# Patient Record
Sex: FEMALE | Race: WHITE | NOT HISPANIC OR LATINO | Employment: PART TIME | URBAN - METROPOLITAN AREA
[De-identification: names, ages, dates, MRNs, and addresses within clinical notes are randomized per-mention and may not be internally consistent; named-entity substitution may affect disease eponyms.]

---

## 2021-06-02 ENCOUNTER — RECORDS - HEALTHEAST (OUTPATIENT)
Dept: ADMINISTRATIVE | Facility: CLINIC | Age: 51
End: 2021-06-02

## 2023-11-22 ENCOUNTER — LAB REQUISITION (OUTPATIENT)
Dept: LAB | Facility: CLINIC | Age: 53
End: 2023-11-22

## 2023-11-22 LAB
PATH REPORT.COMMENTS IMP SPEC: NORMAL
PATH REPORT.FINAL DX SPEC: NORMAL
PATH REPORT.GROSS SPEC: NORMAL
PATH REPORT.MICROSCOPIC SPEC OTHER STN: NORMAL
PATH REPORT.RELEVANT HX SPEC: NORMAL
PATH REPORT.RELEVANT HX SPEC: NORMAL
PATH REPORT.SITE OF ORIGIN SPEC: NORMAL

## 2024-09-28 ENCOUNTER — HOSPITAL ENCOUNTER (EMERGENCY)
Facility: CLINIC | Age: 54
Discharge: HOME | End: 2024-09-28
Attending: EMERGENCY MEDICINE | Admitting: EMERGENCY MEDICINE
Payer: COMMERCIAL

## 2024-09-28 ENCOUNTER — APPOINTMENT (OUTPATIENT)
Dept: CT IMAGING | Facility: CLINIC | Age: 54
End: 2024-09-28
Payer: COMMERCIAL

## 2024-09-28 VITALS
OXYGEN SATURATION: 96 % | HEIGHT: 61 IN | SYSTOLIC BLOOD PRESSURE: 138 MMHG | BODY MASS INDEX: 33.99 KG/M2 | WEIGHT: 180 LBS | RESPIRATION RATE: 18 BRPM | DIASTOLIC BLOOD PRESSURE: 74 MMHG | HEART RATE: 89 BPM | TEMPERATURE: 97.8 F

## 2024-09-28 DIAGNOSIS — N20.0 NEPHROLITHIASIS: ICD-10-CM

## 2024-09-28 DIAGNOSIS — N20.1 RIGHT URETERAL STONE: ICD-10-CM

## 2024-09-28 LAB
ALBUMIN SERPL BCG-MCNC: 4 G/DL (ref 3.5–5.2)
ALBUMIN UR-MCNC: 100 MG/DL
ALP SERPL-CCNC: 85 U/L (ref 40–150)
ALT SERPL W P-5'-P-CCNC: 35 U/L (ref 0–50)
ANION GAP SERPL CALCULATED.3IONS-SCNC: 14 MMOL/L (ref 7–15)
APPEARANCE UR: ABNORMAL
AST SERPL W P-5'-P-CCNC: 32 U/L (ref 0–45)
BASOPHILS # BLD AUTO: 0 10E3/UL (ref 0–0.2)
BASOPHILS NFR BLD AUTO: 1 %
BILIRUB DIRECT SERPL-MCNC: NORMAL MG/DL
BILIRUB SERPL-MCNC: 0.3 MG/DL
BILIRUB UR QL STRIP: NEGATIVE
BUN SERPL-MCNC: 13.3 MG/DL (ref 6–20)
CALCIUM SERPL-MCNC: 9.4 MG/DL (ref 8.8–10.4)
CHLORIDE SERPL-SCNC: 105 MMOL/L (ref 98–107)
COLOR UR AUTO: ABNORMAL
CREAT SERPL-MCNC: 0.82 MG/DL (ref 0.51–0.95)
EGFRCR SERPLBLD CKD-EPI 2021: 84 ML/MIN/1.73M2
EOSINOPHIL # BLD AUTO: 0 10E3/UL (ref 0–0.7)
EOSINOPHIL NFR BLD AUTO: 0 %
ERYTHROCYTE [DISTWIDTH] IN BLOOD BY AUTOMATED COUNT: 13 % (ref 10–15)
GLUCOSE SERPL-MCNC: 93 MG/DL (ref 70–99)
GLUCOSE UR STRIP-MCNC: NEGATIVE MG/DL
HCO3 SERPL-SCNC: 19 MMOL/L (ref 22–29)
HCT VFR BLD AUTO: 40.7 % (ref 35–47)
HGB BLD-MCNC: 13.3 G/DL (ref 11.7–15.7)
HGB UR QL STRIP: ABNORMAL
IMM GRANULOCYTES # BLD: 0 10E3/UL
IMM GRANULOCYTES NFR BLD: 0 %
KETONES UR STRIP-MCNC: NEGATIVE MG/DL
LEUKOCYTE ESTERASE UR QL STRIP: ABNORMAL
LYMPHOCYTES # BLD AUTO: 1.9 10E3/UL (ref 0.8–5.3)
LYMPHOCYTES NFR BLD AUTO: 40 %
MAGNESIUM SERPL-MCNC: 1.8 MG/DL (ref 1.7–2.3)
MCH RBC QN AUTO: 28 PG (ref 26.5–33)
MCHC RBC AUTO-ENTMCNC: 32.7 G/DL (ref 31.5–36.5)
MCV RBC AUTO: 86 FL (ref 78–100)
MONOCYTES # BLD AUTO: 0.4 10E3/UL (ref 0–1.3)
MONOCYTES NFR BLD AUTO: 8 %
MUCOUS THREADS #/AREA URNS LPF: PRESENT /LPF
NEUTROPHILS # BLD AUTO: 2.5 10E3/UL (ref 1.6–8.3)
NEUTROPHILS NFR BLD AUTO: 51 %
NITRATE UR QL: NEGATIVE
NRBC # BLD AUTO: 0 10E3/UL
NRBC BLD AUTO-RTO: 0 /100
PH UR STRIP: 5.5 [PH] (ref 5–7)
PLATELET # BLD AUTO: 241 10E3/UL (ref 150–450)
POTASSIUM SERPL-SCNC: 4.2 MMOL/L (ref 3.4–5.3)
PROT SERPL-MCNC: 7 G/DL (ref 6.4–8.3)
RBC # BLD AUTO: 4.75 10E6/UL (ref 3.8–5.2)
RBC URINE: >182 /HPF
SODIUM SERPL-SCNC: 138 MMOL/L (ref 135–145)
SP GR UR STRIP: 1.02 (ref 1–1.03)
SQUAMOUS EPITHELIAL: 1 /HPF
UROBILINOGEN UR STRIP-MCNC: 2 MG/DL
WBC # BLD AUTO: 4.8 10E3/UL (ref 4–11)
WBC URINE: 1 /HPF

## 2024-09-28 PROCEDURE — 258N000003 HC RX IP 258 OP 636

## 2024-09-28 PROCEDURE — 80048 BASIC METABOLIC PNL TOTAL CA: CPT

## 2024-09-28 PROCEDURE — 96361 HYDRATE IV INFUSION ADD-ON: CPT

## 2024-09-28 PROCEDURE — 250N000011 HC RX IP 250 OP 636: Performed by: EMERGENCY MEDICINE

## 2024-09-28 PROCEDURE — 74177 CT ABD & PELVIS W/CONTRAST: CPT

## 2024-09-28 PROCEDURE — 81001 URINALYSIS AUTO W/SCOPE: CPT | Performed by: EMERGENCY MEDICINE

## 2024-09-28 PROCEDURE — 36415 COLL VENOUS BLD VENIPUNCTURE: CPT

## 2024-09-28 PROCEDURE — 83735 ASSAY OF MAGNESIUM: CPT

## 2024-09-28 PROCEDURE — 99285 EMERGENCY DEPT VISIT HI MDM: CPT | Mod: 25

## 2024-09-28 PROCEDURE — 96375 TX/PRO/DX INJ NEW DRUG ADDON: CPT

## 2024-09-28 PROCEDURE — 85025 COMPLETE CBC W/AUTO DIFF WBC: CPT

## 2024-09-28 PROCEDURE — 96374 THER/PROPH/DIAG INJ IV PUSH: CPT | Mod: 59

## 2024-09-28 PROCEDURE — 250N000011 HC RX IP 250 OP 636

## 2024-09-28 RX ORDER — OXYCODONE HYDROCHLORIDE 5 MG/1
5 TABLET ORAL EVERY 6 HOURS PRN
Qty: 12 TABLET | Refills: 0 | Status: SHIPPED | OUTPATIENT
Start: 2024-09-28 | End: 2024-10-01

## 2024-09-28 RX ORDER — ONDANSETRON 4 MG/1
4 TABLET, ORALLY DISINTEGRATING ORAL EVERY 8 HOURS PRN
Qty: 10 TABLET | Refills: 0 | Status: SHIPPED | OUTPATIENT
Start: 2024-09-28 | End: 2024-10-01

## 2024-09-28 RX ORDER — IOPAMIDOL 755 MG/ML
90 INJECTION, SOLUTION INTRAVASCULAR ONCE
Status: COMPLETED | OUTPATIENT
Start: 2024-09-28 | End: 2024-09-28

## 2024-09-28 RX ORDER — KETOROLAC TROMETHAMINE 15 MG/ML
15 INJECTION, SOLUTION INTRAMUSCULAR; INTRAVENOUS ONCE
Status: COMPLETED | OUTPATIENT
Start: 2024-09-28 | End: 2024-09-28

## 2024-09-28 RX ORDER — ONDANSETRON 2 MG/ML
4 INJECTION INTRAMUSCULAR; INTRAVENOUS ONCE
Status: COMPLETED | OUTPATIENT
Start: 2024-09-28 | End: 2024-09-28

## 2024-09-28 RX ADMIN — IOPAMIDOL 90 ML: 755 INJECTION, SOLUTION INTRAVENOUS at 10:56

## 2024-09-28 RX ADMIN — KETOROLAC TROMETHAMINE 15 MG: 15 INJECTION, SOLUTION INTRAMUSCULAR; INTRAVENOUS at 10:09

## 2024-09-28 RX ADMIN — SODIUM CHLORIDE 1000 ML: 9 INJECTION, SOLUTION INTRAVENOUS at 10:03

## 2024-09-28 RX ADMIN — ONDANSETRON 4 MG: 2 INJECTION INTRAMUSCULAR; INTRAVENOUS at 10:07

## 2024-09-28 ASSESSMENT — COLUMBIA-SUICIDE SEVERITY RATING SCALE - C-SSRS
2. HAVE YOU ACTUALLY HAD ANY THOUGHTS OF KILLING YOURSELF IN THE PAST MONTH?: NO
6. HAVE YOU EVER DONE ANYTHING, STARTED TO DO ANYTHING, OR PREPARED TO DO ANYTHING TO END YOUR LIFE?: NO
1. IN THE PAST MONTH, HAVE YOU WISHED YOU WERE DEAD OR WISHED YOU COULD GO TO SLEEP AND NOT WAKE UP?: NO

## 2024-09-28 ASSESSMENT — ACTIVITIES OF DAILY LIVING (ADL)
ADLS_ACUITY_SCORE: 35

## 2024-09-28 NOTE — ED NOTES
Reviewed discharge instructions with pt and daughter. Answered all questions. Provided pt with edda.

## 2024-09-28 NOTE — ED PROVIDER NOTES
"Emergency Department Midlevel Supervisory Note     I had a face to face encounter with this patient seen by the Advanced Practice Provider (TOOTIE). I personally made/approved the management plan and take responsibility for the patient management. I personally saw patient and performed a substantive portion of the visit including all aspects of the medical decision making.     ED Course:  9:37 AM Sera Padilla PA-C staffed patient with me. I agree with their assessment and plan of management, and I will see the patient.  9:39 AM I met with the patient to introduce myself, gather additional history, perform my initial exam, and discuss the plan.   9:50 AM UA with RBCs without bacteria/infection, consistent with possible ureteral stone.  11:14 AM Serum labs reassuring.  CT independently reviewed and shows approx 4mm proximal to mid ureteral stone.    11:58 AM Pt feeling well on re-evaluation.  Updated by TOOTIE Sera regarding results. Referral to [facility] for ureteral stone, Rx for oxycodone/zofran, return precautions understood.      Brief HPI:     Saima Gilbert is a 54 year old female who presents with 2 days of right-sided flank pain with nausea, vomiting, decreased oral inatke, inability to urinate intermittently, and hematuria; denies any dysuria. Reports a prior history fever kidney stones.    I, Slick Rodriguez, am serving as a scribe to document services personally performed by Dr. Carlos A Gasca based on my observations and the provider's statements to me.   I, Carlos A Gasca, DO, attest that Slick Rodriguez was acting in a scribe capacity, has observed my performance of the services and has documented them in accordance with my direction.    Brief Physical Exam: /58   Pulse 86   Temp 97.8  F (36.6  C) (Oral)   Resp 18   Ht 1.549 m (5' 1\")   Wt 81.6 kg (180 lb)   SpO2 100%   BMI 34.01 kg/m    Constitutional:  Alert, in no acute distress  EYES: Conjunctivae clear  HENT:  Atraumatic  Respiratory:  " Respirations even, unlabored, in no acute respiratory distress  Cardiovascular:  Regular rate and rhythm, good peripheral perfusion, 2+ radial and DP pulses b/l  GI: Soft, non-distended, mildly TTP right side abdomen.  No true CVA tenderness, no rash.  No rigidity.  Musculoskeletal:  Moves all 4 extremities equally, grossly symmetrical strength  Integument: Warm & dry. No appreciable rash, erythema.  Neurologic:  Alert & oriented, speech clear and fluent, no focal deficits noted  Psych: Normal mood and affect       MDM:  Pt seen in conjunction with TOOTIE Padilla, here for evaluation of right side/flank pain for 2 days.  Pain sounds fairly constant, but pt reports some hematuria and similar symptoms before with kidney stones.  Agree with labs, CT abd/pelvis, symptomatic treatment and reassessment after that. Consideration for ureteral stone, but also pyelonephritis, diverticulitis/appendicitis, colitis or other intraabdominal pathology.        1. Nephrolithiasis    2. Right ureteral stone        Consults:  none    Labs and Imaging:  Results for orders placed or performed during the hospital encounter of 09/28/24   CT Abdomen Pelvis w Contrast    Impression    IMPRESSION:   Obstructing 0.4 cm calculus in the proximal right ureter with mild upstream collecting system dilation.     UA with Microscopic reflex to Culture    Specimen: Urine, Clean Catch   Result Value Ref Range    Color Urine Light Red (A) Colorless, Straw, Light Yellow, Yellow    Appearance Urine Cloudy (A) Clear    Glucose Urine Negative Negative mg/dL    Bilirubin Urine Negative Negative    Ketones Urine Negative Negative mg/dL    Specific Gravity Urine 1.025 1.001 - 1.030    Blood Urine >1.0 mg/dL (A) Negative    pH Urine 5.5 5.0 - 7.0    Protein Albumin Urine 100 (A) Negative mg/dL    Urobilinogen Urine 2.0 (A) <2.0 mg/dL    Nitrite Urine Negative Negative    Leukocyte Esterase Urine 25 Jessi/uL (A) Negative    Mucus Urine Present (A) None Seen  /LPF    RBC Urine >182 (H) <=2 /HPF    WBC Urine 1 <=5 /HPF    Squamous Epithelials Urine 1 <=1 /HPF   Basic metabolic panel   Result Value Ref Range    Sodium 138 135 - 145 mmol/L    Potassium 4.2 3.4 - 5.3 mmol/L    Chloride 105 98 - 107 mmol/L    Carbon Dioxide (CO2) 19 (L) 22 - 29 mmol/L    Anion Gap 14 7 - 15 mmol/L    Urea Nitrogen 13.3 6.0 - 20.0 mg/dL    Creatinine 0.82 0.51 - 0.95 mg/dL    GFR Estimate 84 >60 mL/min/1.73m2    Calcium 9.4 8.8 - 10.4 mg/dL    Glucose 93 70 - 99 mg/dL   Hepatic function panel   Result Value Ref Range    Protein Total 7.0 6.4 - 8.3 g/dL    Albumin 4.0 3.5 - 5.2 g/dL    Bilirubin Total 0.3 <=1.2 mg/dL    Alkaline Phosphatase 85 40 - 150 U/L    AST 32 0 - 45 U/L    ALT 35 0 - 50 U/L    Bilirubin Direct     Result Value Ref Range    Magnesium 1.8 1.7 - 2.3 mg/dL   CBC with platelets and differential   Result Value Ref Range    WBC Count 4.8 4.0 - 11.0 10e3/uL    RBC Count 4.75 3.80 - 5.20 10e6/uL    Hemoglobin 13.3 11.7 - 15.7 g/dL    Hematocrit 40.7 35.0 - 47.0 %    MCV 86 78 - 100 fL    MCH 28.0 26.5 - 33.0 pg    MCHC 32.7 31.5 - 36.5 g/dL    RDW 13.0 10.0 - 15.0 %    Platelet Count 241 150 - 450 10e3/uL    % Neutrophils 51 %    % Lymphocytes 40 %    % Monocytes 8 %    % Eosinophils 0 %    % Basophils 1 %    % Immature Granulocytes 0 %    NRBCs per 100 WBC 0 <1 /100    Absolute Neutrophils 2.5 1.6 - 8.3 10e3/uL    Absolute Lymphocytes 1.9 0.8 - 5.3 10e3/uL    Absolute Monocytes 0.4 0.0 - 1.3 10e3/uL    Absolute Eosinophils 0.0 0.0 - 0.7 10e3/uL    Absolute Basophils 0.0 0.0 - 0.2 10e3/uL    Absolute Immature Granulocytes 0.0 <=0.4 10e3/uL    Absolute NRBCs 0.0 10e3/uL       I have reviewed the relevant laboratory studies above.    I independently interpreted the following imaging study(s):   CT Abdomen/Pelvis - right ureteral stone    Procedures:  I was present for the key portions of procedures documented in TOOTIE/midlevel note, see midlevel note for further details.    Carlos A  DO DAMI Gasca [facility] EMERGENCY ROOM  [address] [facility] [id number]  [phone]     Carlos A Gasca MD  09/28/24 1200

## 2024-09-28 NOTE — ED TRIAGE NOTES
Pt presents to the ED with c/o right flank pain and abdominal pain for past 2 days. Pt c/o blood in urine, emesis, and chills.

## 2024-09-28 NOTE — DISCHARGE INSTRUCTIONS
Your CT did show a 4 mm obstructing kidney stone on the right side which is likely the cause of your symptoms.  I will send you home with a short course of pain medication as well as a nausea medicine to help with your symptoms until you are able to pass the stone.  Additionally, I did give you a referral to urology for further evaluation and management of kidney stones since you have had multiple bouts of kidney stones.  Please return to the emergency department if you experience fever, chills, worsening nausea vomiting, worsening pain.

## 2024-09-28 NOTE — Clinical Note
Saima Gilbert was seen and treated in our emergency department on 9/28/2024.  She may return to work on 09/29/2024.  Saima was just diagnosed with a kidney stone and is experiencing severe pain as a result. She has been discharge with appropriate medications however as the stone changes position, she could continue to experience pain. Please allow her to stay home from work as needed and provide activity modifications if necessary.     If you have any questions or concerns, please don't hesitate to call.      Sera Padilla PA-C

## 2024-09-28 NOTE — ED PROVIDER NOTES
Emergency Department Encounter   NAME: Saima Gilbert ; AGE: 54 year old female ; YOB: 1970 ; MRN: [id number] ; PCP: No primary care provider on file.   ED PROVIDER: Sera Padilla PA-C    Evaluation Date & Time:   9/28/2024  9:15 AM    CHIEF COMPLAINT:  Flank Pain and Abdominal Pain      FINAL IMPRESSION:  No diagnosis found.      IMPRESSION AND PLAN   MDM: Saima Gilbert is a 54 year old female with a pertinent history of DM 2, HLD, HTN, vertigo who presents to the ED by walk-in for evaluation of right-sided flank pain x 2 days.    Vitals stable. On exam patient is well-appearing and in no acute distress.  Abdomen is soft with mild tenderness to the RLQ and LLQ.  No suprapubic tenderness.  There is notable right-sided CVA tenderness, no left-sided CVA tenderness.  Differentials include UTI, pyelonephritis, nephrolithiasis, appendicitis, ovarian etiology, diverticulitis.  Less concerned for bowel obstruction at this time due to the absence of distention. Patient is s/p cholecystectomy.     Patient given IV fluids, Toradol and Zofran for initial symptom management.  CBC without evidence of leukocytosis, hemoglobin stable at 13.3.  BMP negative for FAITH or emergent electrolyte abnormality, hepatic function unremarkable.  UA with marked amount of blood otherwise no evidence of infectious etiology.  CT did reveal a 0.4 cm obstructing kidney stone in the right proximal ureter which is likely the cause of patient's symptoms. Upon reevaluation, patient continues to rest comfortably in hospital bed and remains vitally stable.  I discussed CT findings with patient and she expressed her understanding.  I explained that given the size of the kidney stone, she should be able to pass it on her own and is not requiring admission at this time, especially given the absence of an associated UTI.  I will prescribe patient a short course of oxycodone and Zofran for outpatient symptom management.  I also did  provide her with an adult urology referral for kidney stone prevention as she has had several bouts of kidney stones in the past.  Patient is amenable to this plan and feels comfortable discharge at this time.    Patient seen in conjunction with Dr. Gasca.    History:  Supplemental history from: Family Member/Significant Other  External Record(s) reviewed: Documented in chart    Work Up:  Chart documentation includes differential considered and any EKGs or imaging independently interpreted by provider, where specified.  In additional to work up documented, I considered the following work up: Documented in chart, if applicable.    External consultation:  Discussion of management with another provider: Documented in chart, if applicable    Complicating factors:  Care impacted by chronic illness: See HPI.  Care affected by social determinants of health: N/A    Disposition considerations:  Admission considered, workup negative for infected kidney stone.    Chart Review: I reviewed urgent care note from 7/31/2023.  Patient presented to urgent care complaining of dysuria and flank pain x 1 week.  She does have a history of kidney stone once in the past.  CT revealed a small nonobstructing renal stone in the right kidney as well as mild pelviectasis of the superior left renal pole.  Patient was given ibuprofen and likely passed kidney stone prior to the visit.  Encouraged to take ibuprofen and stay orally hydrated.  Patient was discharged in stable condition.    ED COURSE:  9:34 AM I met and introduced myself to the patient. I gathered initial history and performed my physical exam. We discussed plan for initial workup.   9:36 AM I have staffed the patient with Dr. Gasca, ED MD, who has evaluated the patient and agrees with all aspects of today's care.   12:00 PM I rechecked the patient and discussed results, discharge, follow up, and reasons to return to the ED.         MEDICATIONS GIVEN IN THE EMERGENCY  "DEPARTMENT:  Medications - No data to display      NEW PRESCRIPTIONS STARTED AT TODAY'S ED VISIT:  New Prescriptions    No medications on file         BRIEF HPI   Patient information was obtained from: Patient and Daughter   Use of Intrepreter: N/A     Saima Gilbert is a 54 year old female who presents emergency department with right-sided flank pain.  Patient reports pain began abruptly 2 days ago, she denies any trauma or inciting event prior to the onset of the symptoms.  She does have a history of kidney stones and states the symptoms are similar to those in the past.  Patient has taken Tylenol which is only provided mild relief in her symptoms.  Patient does endorse hematuria but otherwise denies dysuria.  She also endorses nausea, bilious vomiting, decreased oral intake and inability to urinate intermittently.  Patient otherwise denies fever, chills, diarrhea, constipation, hematochezia, melena.      REVIEW OF SYSTEMS:  Pertinent positive and negative symptoms per HPI.       MEDICAL HISTORY     No past medical history on file.    Past Surgical History:   Procedure Laterality Date    CHOLECYSTECTOMY         No family history on file.    Social History     Tobacco Use    Smoking status: Never   Substance Use Topics    Alcohol use: No       ALLERGY RELIEF, LORATADINE, 10 mg tablet        PHYSICAL EXAM     First Vitals:  Patient Vitals for the past 24 hrs:   Height Weight   09/28/24 0914 1.549 m (5' 1\") 81.6 kg (180 lb)       PHYSICAL EXAM:  Physical Exam  Vitals and nursing note reviewed.   Constitutional:       General: She is not in acute distress.     Appearance: She is obese. She is not ill-appearing.   Abdominal:      General: Abdomen is flat. There is no distension.      Palpations: Abdomen is soft.      Tenderness: There is abdominal tenderness in the right lower quadrant, suprapubic area and left lower quadrant. There is right CVA tenderness. There is no left CVA tenderness or guarding. Negative signs " include Garsia's sign and McBurney's sign.   Skin:     General: Skin is warm and dry.   Neurological:      General: No focal deficit present.      Mental Status: She is alert and oriented to person, place, and time.   Psychiatric:         Mood and Affect: Mood normal.        RESULTS     LAB:  All pertinent labs reviewed and interpreted  Labs Ordered and Resulted from Time of ED Arrival to Time of ED Departure - No data to display    RADIOLOGY:  No orders to display       Sera Padilla PA-C  Emergency Medicine   [facility] EMERGENCY ROOM       Sera Padilla PA-C  09/28/24 1200

## 2024-09-30 ENCOUNTER — HOSPITAL ENCOUNTER (EMERGENCY)
Facility: CLINIC | Age: 54
Discharge: HOME | End: 2024-09-30
Attending: EMERGENCY MEDICINE | Admitting: EMERGENCY MEDICINE
Payer: COMMERCIAL

## 2024-09-30 VITALS
TEMPERATURE: 97.7 F | OXYGEN SATURATION: 95 % | WEIGHT: 191 LBS | DIASTOLIC BLOOD PRESSURE: 76 MMHG | HEART RATE: 84 BPM | BODY MASS INDEX: 37.5 KG/M2 | RESPIRATION RATE: 18 BRPM | HEIGHT: 60 IN | SYSTOLIC BLOOD PRESSURE: 123 MMHG

## 2024-09-30 DIAGNOSIS — N23 URETERAL COLIC: ICD-10-CM

## 2024-09-30 DIAGNOSIS — R11.2 NAUSEA AND VOMITING, UNSPECIFIED VOMITING TYPE: ICD-10-CM

## 2024-09-30 DIAGNOSIS — N20.1 RIGHT URETERAL STONE: ICD-10-CM

## 2024-09-30 PROBLEM — G47.33 OSA (OBSTRUCTIVE SLEEP APNEA): Status: ACTIVE | Noted: 2018-01-23

## 2024-09-30 LAB
ALBUMIN SERPL BCG-MCNC: 3.8 G/DL (ref 3.5–5.2)
ALBUMIN UR-MCNC: NEGATIVE MG/DL
ALBUMIN UR-MCNC: NEGATIVE MG/DL
ALP SERPL-CCNC: 80 U/L (ref 40–150)
ALT SERPL W P-5'-P-CCNC: 28 U/L (ref 0–50)
ANION GAP SERPL CALCULATED.3IONS-SCNC: 15 MMOL/L (ref 7–15)
APPEARANCE UR: ABNORMAL
APPEARANCE UR: CLEAR
AST SERPL W P-5'-P-CCNC: 27 U/L (ref 0–45)
BACTERIA #/AREA URNS HPF: ABNORMAL /HPF
BASOPHILS # BLD AUTO: 0 10E3/UL (ref 0–0.2)
BASOPHILS NFR BLD AUTO: 1 %
BILIRUB DIRECT SERPL-MCNC: NORMAL MG/DL
BILIRUB SERPL-MCNC: 0.4 MG/DL
BILIRUB UR QL STRIP: NEGATIVE
BILIRUB UR QL STRIP: NEGATIVE
BUN SERPL-MCNC: 11.3 MG/DL (ref 6–20)
CALCIUM SERPL-MCNC: 9.3 MG/DL (ref 8.8–10.4)
CHLORIDE SERPL-SCNC: 105 MMOL/L (ref 98–107)
COLOR UR AUTO: ABNORMAL
COLOR UR AUTO: COLORLESS
CREAT SERPL-MCNC: 1.17 MG/DL (ref 0.51–0.95)
CRP SERPL-MCNC: 18.7 MG/L
EGFRCR SERPLBLD CKD-EPI 2021: 55 ML/MIN/1.73M2
EOSINOPHIL # BLD AUTO: 0 10E3/UL (ref 0–0.7)
EOSINOPHIL NFR BLD AUTO: 0 %
ERYTHROCYTE [DISTWIDTH] IN BLOOD BY AUTOMATED COUNT: 13.2 % (ref 10–15)
GLUCOSE SERPL-MCNC: 94 MG/DL (ref 70–99)
GLUCOSE UR STRIP-MCNC: NEGATIVE MG/DL
GLUCOSE UR STRIP-MCNC: NEGATIVE MG/DL
HCO3 SERPL-SCNC: 19 MMOL/L (ref 22–29)
HCT VFR BLD AUTO: 40.5 % (ref 35–47)
HGB BLD-MCNC: 13.3 G/DL (ref 11.7–15.7)
HGB UR QL STRIP: ABNORMAL
HGB UR QL STRIP: ABNORMAL
IMM GRANULOCYTES # BLD: 0 10E3/UL
IMM GRANULOCYTES NFR BLD: 0 %
KETONES UR STRIP-MCNC: NEGATIVE MG/DL
KETONES UR STRIP-MCNC: NEGATIVE MG/DL
LEUKOCYTE ESTERASE UR QL STRIP: ABNORMAL
LEUKOCYTE ESTERASE UR QL STRIP: NEGATIVE
LYMPHOCYTES # BLD AUTO: 2.1 10E3/UL (ref 0.8–5.3)
LYMPHOCYTES NFR BLD AUTO: 37 %
MAGNESIUM SERPL-MCNC: 1.8 MG/DL (ref 1.7–2.3)
MCH RBC QN AUTO: 28.4 PG (ref 26.5–33)
MCHC RBC AUTO-ENTMCNC: 32.8 G/DL (ref 31.5–36.5)
MCV RBC AUTO: 87 FL (ref 78–100)
MONOCYTES # BLD AUTO: 0.3 10E3/UL (ref 0–1.3)
MONOCYTES NFR BLD AUTO: 6 %
MUCOUS THREADS #/AREA URNS LPF: PRESENT /LPF
NEUTROPHILS # BLD AUTO: 3.1 10E3/UL (ref 1.6–8.3)
NEUTROPHILS NFR BLD AUTO: 56 %
NITRATE UR QL: NEGATIVE
NITRATE UR QL: NEGATIVE
NRBC # BLD AUTO: 0 10E3/UL
NRBC BLD AUTO-RTO: 0 /100
PH UR STRIP: 5.5 [PH] (ref 5–7)
PH UR STRIP: 5.5 [PH] (ref 5–7)
PLATELET # BLD AUTO: 243 10E3/UL (ref 150–450)
POTASSIUM SERPL-SCNC: 4.2 MMOL/L (ref 3.4–5.3)
PROCALCITONIN SERPL IA-MCNC: 0.06 NG/ML
PROT SERPL-MCNC: 6.9 G/DL (ref 6.4–8.3)
RBC # BLD AUTO: 4.68 10E6/UL (ref 3.8–5.2)
RBC URINE: 15 /HPF
RBC URINE: <1 /HPF
SODIUM SERPL-SCNC: 139 MMOL/L (ref 135–145)
SP GR UR STRIP: 1 (ref 1–1.03)
SP GR UR STRIP: 1.01 (ref 1–1.03)
SQUAMOUS EPITHELIAL: 1 /HPF
SQUAMOUS EPITHELIAL: 12 /HPF
UROBILINOGEN UR STRIP-MCNC: <2 MG/DL
UROBILINOGEN UR STRIP-MCNC: <2 MG/DL
WBC # BLD AUTO: 5.6 10E3/UL (ref 4–11)
WBC URINE: 1 /HPF
WBC URINE: 16 /HPF

## 2024-09-30 PROCEDURE — 86140 C-REACTIVE PROTEIN: CPT | Performed by: EMERGENCY MEDICINE

## 2024-09-30 PROCEDURE — 99284 EMERGENCY DEPT VISIT MOD MDM: CPT | Mod: 25 | Performed by: EMERGENCY MEDICINE

## 2024-09-30 PROCEDURE — 83735 ASSAY OF MAGNESIUM: CPT | Performed by: EMERGENCY MEDICINE

## 2024-09-30 PROCEDURE — 250N000013 HC RX MED GY IP 250 OP 250 PS 637: Performed by: EMERGENCY MEDICINE

## 2024-09-30 PROCEDURE — 81001 URINALYSIS AUTO W/SCOPE: CPT | Performed by: EMERGENCY MEDICINE

## 2024-09-30 PROCEDURE — 258N000003 HC RX IP 258 OP 636: Performed by: EMERGENCY MEDICINE

## 2024-09-30 PROCEDURE — 85025 COMPLETE CBC W/AUTO DIFF WBC: CPT | Performed by: EMERGENCY MEDICINE

## 2024-09-30 PROCEDURE — 87086 URINE CULTURE/COLONY COUNT: CPT | Performed by: EMERGENCY MEDICINE

## 2024-09-30 PROCEDURE — 96361 HYDRATE IV INFUSION ADD-ON: CPT | Performed by: EMERGENCY MEDICINE

## 2024-09-30 PROCEDURE — 84145 PROCALCITONIN (PCT): CPT | Performed by: EMERGENCY MEDICINE

## 2024-09-30 PROCEDURE — 250N000011 HC RX IP 250 OP 636: Performed by: EMERGENCY MEDICINE

## 2024-09-30 PROCEDURE — 96374 THER/PROPH/DIAG INJ IV PUSH: CPT | Performed by: EMERGENCY MEDICINE

## 2024-09-30 PROCEDURE — 96375 TX/PRO/DX INJ NEW DRUG ADDON: CPT | Performed by: EMERGENCY MEDICINE

## 2024-09-30 PROCEDURE — 36415 COLL VENOUS BLD VENIPUNCTURE: CPT | Performed by: EMERGENCY MEDICINE

## 2024-09-30 PROCEDURE — 80053 COMPREHEN METABOLIC PANEL: CPT | Performed by: EMERGENCY MEDICINE

## 2024-09-30 RX ORDER — LIRAGLUTIDE 6 MG/ML
INJECTION SUBCUTANEOUS
COMMUNITY
Start: 2024-08-14

## 2024-09-30 RX ORDER — ATORVASTATIN CALCIUM 20 MG/1
20 TABLET, FILM COATED ORAL DAILY
COMMUNITY
Start: 2024-04-11

## 2024-09-30 RX ORDER — METFORMIN HCL 500 MG
2000 TABLET, EXTENDED RELEASE 24 HR ORAL
COMMUNITY
Start: 2024-04-11

## 2024-09-30 RX ORDER — METOCLOPRAMIDE 10 MG/1
10 TABLET ORAL 4 TIMES DAILY PRN
Qty: 20 TABLET | Refills: 0 | Status: SHIPPED | OUTPATIENT
Start: 2024-09-30

## 2024-09-30 RX ORDER — KETOROLAC TROMETHAMINE 15 MG/ML
15 INJECTION, SOLUTION INTRAMUSCULAR; INTRAVENOUS ONCE
Status: COMPLETED | OUTPATIENT
Start: 2024-09-30 | End: 2024-09-30

## 2024-09-30 RX ORDER — CETIRIZINE HYDROCHLORIDE 10 MG/1
1 TABLET ORAL
COMMUNITY
Start: 2024-08-12

## 2024-09-30 RX ORDER — METOPROLOL SUCCINATE 25 MG/1
1 TABLET, EXTENDED RELEASE ORAL DAILY
COMMUNITY
Start: 2024-01-18 | End: 2025-01-17

## 2024-09-30 RX ORDER — TAMSULOSIN HYDROCHLORIDE 0.4 MG/1
0.4 CAPSULE ORAL DAILY
Qty: 10 CAPSULE | Refills: 0 | Status: SHIPPED | OUTPATIENT
Start: 2024-09-30 | End: 2024-10-10

## 2024-09-30 RX ORDER — ONDANSETRON 2 MG/ML
4 INJECTION INTRAMUSCULAR; INTRAVENOUS ONCE
Status: COMPLETED | OUTPATIENT
Start: 2024-09-30 | End: 2024-09-30

## 2024-09-30 RX ORDER — ONDANSETRON 4 MG/1
4 TABLET, ORALLY DISINTEGRATING ORAL EVERY 8 HOURS PRN
Qty: 20 TABLET | Refills: 0 | Status: SHIPPED | OUTPATIENT
Start: 2024-09-30

## 2024-09-30 RX ORDER — TAMSULOSIN HYDROCHLORIDE 0.4 MG/1
0.4 CAPSULE ORAL ONCE
Status: COMPLETED | OUTPATIENT
Start: 2024-09-30 | End: 2024-09-30

## 2024-09-30 RX ADMIN — TAMSULOSIN HYDROCHLORIDE 0.4 MG: 0.4 CAPSULE ORAL at 08:58

## 2024-09-30 RX ADMIN — SODIUM CHLORIDE, POTASSIUM CHLORIDE, SODIUM LACTATE AND CALCIUM CHLORIDE 1000 ML: 600; 310; 30; 20 INJECTION, SOLUTION INTRAVENOUS at 08:57

## 2024-09-30 RX ADMIN — KETOROLAC TROMETHAMINE 15 MG: 15 INJECTION, SOLUTION INTRAMUSCULAR; INTRAVENOUS at 08:56

## 2024-09-30 RX ADMIN — ONDANSETRON 4 MG: 2 INJECTION INTRAMUSCULAR; INTRAVENOUS at 08:54

## 2024-09-30 ASSESSMENT — COLUMBIA-SUICIDE SEVERITY RATING SCALE - C-SSRS
1. IN THE PAST MONTH, HAVE YOU WISHED YOU WERE DEAD OR WISHED YOU COULD GO TO SLEEP AND NOT WAKE UP?: NO
6. HAVE YOU EVER DONE ANYTHING, STARTED TO DO ANYTHING, OR PREPARED TO DO ANYTHING TO END YOUR LIFE?: NO
2. HAVE YOU ACTUALLY HAD ANY THOUGHTS OF KILLING YOURSELF IN THE PAST MONTH?: NO

## 2024-09-30 ASSESSMENT — ACTIVITIES OF DAILY LIVING (ADL)
ADLS_ACUITY_SCORE: 35

## 2024-09-30 NOTE — ED PROVIDER NOTES
EMERGENCY DEPARTMENT ENCOUNTER      NAME: Saima Gilbert  AGE: 54 year old female  YOB: 1970  MRN: [id number]  EVALUATION DATE & TIME: No admission date for patient encounter.    PCP: Carina Hook    ED PROVIDER: Laron Rodriguez M.D.      Chief Complaint   Patient presents with    Flank Pain         IMPRESSION  1. Ureteral colic    2. Right ureteral stone    3. Nausea and vomiting, unspecified vomiting type        PLAN  - routine home kidney stone cares (scheduled NSAIDs, PRN oxycodone, PRN Zofran, Reglan, Flomax, urine strainer)  - close PCP & [facility] follow up  - discharge to home    ED COURSE & MEDICAL DECISION MAKING    ED Course as of 09/30/24 1114   Mon Sep 30, 2024   0834 54yoF with history of HTN, HLD, T2DM (not on insulin) who was seen in the ED 2 days ago with 4mm right proximal ureteral stone; discharged home. Returns to the ED this morning with her daughter from home for evaluation of persistent vomiting as well as ongoing pain. Reports she has been unable to keep anything down since yesterday including any meds; right flank pain progressively worsening since then as well. Associated chills; no fevers or sweats. No dysuria, hematuria. Came for ongoing symptoms; no acute worsening this morning.    Normal vitals on presentation. Uncomfortable on exam holding her right flank with no CVA tenderness or abdominal tenderness, clear lungs, normal work of breathing, steady unaccompanied gait with normal neuro exam.    Has ongoing ureteral colic; will obtain repeat blood/urine tests while giving IVF, Toradol, Zofran, Flomax for symptoms. Doubt benefit to repeat CT at this time; patient comfortable not obtaining this here now. Patient comfortable with this plan; no further questions at this time.   0944 Patient feeling improved on recheck with no ongoing nausea & pain resolved.    Blood tests reassuring with no FAITH, no leukocytosis, negative procal; LFTs unremarkable as well.    Initial urine  contaminated with squams; precludes UTI evaluation. At least nitrite negative. Patient states she will be able to give another sample for clean urine testing.   1047 Repeat urine clean catch and no suggestion of UTI.   1113 Patient asymptomatic on recheck with normal vitals. Tolerating PO without difficulty at this time. Ok for ongoing outpatient management. Urology EMR referral made and contact info given as well. Additional nausea prescriptions given. Patient & daughter comfortable with discharge at this time. Return precautions and need for PCP & Urology follow up discussed and understood. No further questions at the time of discharge.       --------------------------------------------------------------------------------   --------------------------------------------------------------------------------     8:41 AM I met with the patient for the initial interview and physical examination. Discussed plan for treatment and workup in the ED.      This patient involved a high degree of complexity in medical decision making, as significant risks were present and assessed. Recent encounters & results in medical record reviewed by me.    All workup (i.e. any EKG/labs/imaging as per charting below) reviewed and independently interpreted by me. See respective sections for details.    Broad differential considered for this patient, including but not limited to:  ureteral stone, FAITH, UTI, pyelonephritis, bacteremia, sepsis, acute aortic syndrome, SBO, perforated viscus, intraabdominal abscess      See additional MDM below if interested.      MEDICATIONS GIVEN IN THE EMERGENCY DEPARTMENT  Medications   lactated ringers BOLUS 1,000 mL (0 mLs Intravenous Stopped 9/30/24 1112)   ketorolac (TORADOL) injection 15 mg (15 mg Intravenous $Given 9/30/24 0856)   ondansetron (ZOFRAN) injection 4 mg (4 mg Intravenous $Given 9/30/24 0854)   tamsulosin (FLOMAX) capsule 0.4 mg (0.4 mg Oral $Given 9/30/24 2504)   lactated ringers BOLUS 1,000  mL (1,000 mLs Intravenous Not Given 9/30/24 1112)       NEW PRESCRIPTIONS STARTED AT TODAY'S ER VISIT  Current Discharge Medication List        START taking these medications    Details   metoclopramide (REGLAN) 10 MG tablet Take 1 tablet (10 mg) by mouth 4 times daily as needed (nausea).  Qty: 20 tablet, Refills: 0      !! ondansetron (ZOFRAN ODT) 4 MG ODT tab Take 1 tablet (4 mg) by mouth every 8 hours as needed for nausea.  Qty: 20 tablet, Refills: 0      tamsulosin (FLOMAX) 0.4 MG capsule Take 1 capsule (0.4 mg) by mouth daily for 10 doses.  Qty: 10 capsule, Refills: 0       !! - Potential duplicate medications found. Please discuss with provider.        CONTINUE these medications which have NOT CHANGED    Details   amitriptyline (ELAVIL) 25 MG tablet Take 25 mg by mouth every morning.      atorvastatin (LIPITOR) 20 MG tablet Take 20 mg by mouth daily.      cetirizine (ZYRTEC) 10 MG tablet Take 1 tablet by mouth 2 times daily.      metFORMIN (GLUCOPHAGE XR) 500 MG 24 hr tablet Take 2,000 mg by mouth.      metoprolol succinate ER (TOPROL XL) 25 MG 24 hr tablet Take 1 tablet by mouth daily.      oxyCODONE (ROXICODONE) 5 MG tablet Take 1 tablet (5 mg) by mouth every 6 hours as needed for pain.  Qty: 12 tablet, Refills: 0      semaglutide (OZEMPIC) 2 MG/3ML pen Inject 0.25 mg SQ weekly for 2 weeks. If tolerating and if tolerating increase to 0.5 mg weekly      VICTOZA PEN 18 MG/3ML soln INJECT 1.8MG UNDER THE SKIN DAILY      ALLERGY RELIEF, LORATADINE, 10 mg tablet [ALLERGY RELIEF, LORATADINE, 10 MG TABLET]       !! ondansetron (ZOFRAN ODT) 4 MG ODT tab Take 1 tablet (4 mg) by mouth every 8 hours as needed for nausea.  Qty: 10 tablet, Refills: 0       !! - Potential duplicate medications found. Please discuss with provider.              =================================================================      HPI  Use of : N/A       Saima Figueredojovanni is a 54 year old female with a pertinent history of  "diabetes, hypercholesteremia, RAIN, and depression, who presents to this ED by private car for evaluation of vomiting sine 24 (~2 days ago, PTA). Per the patient's daughter, she is unable to keep anything down including her Zofran course. She has been \"out of it\" with her oxycodone course. However, she has been taking ibuprofen without complication. Patient endorses chills. She denies fevers. No other medical concerns are expressed at this time.     Per chart review, patient visited [facility] on 24, with a concern of right sided flank pain. Given IV fluids, Toradol, and Zofran. CBC, BMP, and hepatic function unremarkable. Hemoglobin 13.3. CT abdomen pelvis revealed: 0.4 obstructing kidney stone in right proximal ureter. Prescribed with short term course of oxycodone and Zofran, provided urology referral for kidney stone prevention and history.     --------------- MEDICAL HISTORY ---------------  PAST MEDICAL HISTORY:  Reviewed independently by me.  History reviewed. No pertinent past medical history.  Patient Active Problem List   Diagnosis    Abnormal findings on  screening    Adenomatous polyp of colon    Allergic rhinitis    DM type 2 (diabetes mellitus, type 2) (H)    History of depression    Hypercholesteremia    RAIN (obstructive sleep apnea)    Plantar fascial fibromatosis    Previous  delivery, antepartum condition or complication    Primary hypertension    Vernal conjunctivitis    Vertigo       PAST SURGICAL HISTORY:  Reviewed independently by me.  Past Surgical History:   Procedure Laterality Date    CHOLECYSTECTOMY         CURRENT MEDICATIONS:    Reviewed independently by me.  No current facility-administered medications for this encounter.    Current Outpatient Medications:     amitriptyline (ELAVIL) 25 MG tablet, Take 25 mg by mouth every morning., Disp: , Rfl:     atorvastatin (LIPITOR) 20 MG tablet, Take 20 mg by mouth daily., Disp: , " Rfl:     cetirizine (ZYRTEC) 10 MG tablet, Take 1 tablet by mouth 2 times daily., Disp: , Rfl:     metFORMIN (GLUCOPHAGE XR) 500 MG 24 hr tablet, Take 2,000 mg by mouth., Disp: , Rfl:     metoclopramide (REGLAN) 10 MG tablet, Take 1 tablet (10 mg) by mouth 4 times daily as needed (nausea)., Disp: 20 tablet, Rfl: 0    metoprolol succinate ER (TOPROL XL) 25 MG 24 hr tablet, Take 1 tablet by mouth daily., Disp: , Rfl:     ondansetron (ZOFRAN ODT) 4 MG ODT tab, Take 1 tablet (4 mg) by mouth every 8 hours as needed for nausea., Disp: 20 tablet, Rfl: 0    oxyCODONE (ROXICODONE) 5 MG tablet, Take 1 tablet (5 mg) by mouth every 6 hours as needed for pain., Disp: 12 tablet, Rfl: 0    semaglutide (OZEMPIC) 2 MG/3ML pen, Inject 0.25 mg SQ weekly for 2 weeks. If tolerating and if tolerating increase to 0.5 mg weekly, Disp: , Rfl:     tamsulosin (FLOMAX) 0.4 MG capsule, Take 1 capsule (0.4 mg) by mouth daily for 10 doses., Disp: 10 capsule, Rfl: 0    VICTOZA PEN 18 MG/3ML soln, INJECT 1.8MG UNDER THE SKIN DAILY, Disp: , Rfl:     ALLERGY RELIEF, LORATADINE, 10 mg tablet, [ALLERGY RELIEF, LORATADINE, 10 MG TABLET] , Disp: , Rfl:     ondansetron (ZOFRAN ODT) 4 MG ODT tab, Take 1 tablet (4 mg) by mouth every 8 hours as needed for nausea., Disp: 10 tablet, Rfl: 0    ALLERGIES:  Reviewed independently by me.  No Known Allergies    FAMILY HISTORY:  Reviewed independently by me.  History reviewed. No pertinent family history.    SOCIAL HISTORY:   Reviewed independently by me.  Social History     Socioeconomic History    Marital status:    Tobacco Use    Smoking status: Never   Substance and Sexual Activity    Alcohol use: No       --------------- PHYSICAL EXAM ---------------  Nursing notes and vitals independently reviewed by me.  VITALS:  Vitals:    09/30/24 0836 09/30/24 1005 09/30/24 1109   BP: 121/84 120/65 123/76   Pulse: 87 76 84   Resp: 18  18   Temp: 97.7  F (36.5  C)     TempSrc: Oral     SpO2: 97% 97% 95%   Weight:  86.6 kg (191 lb)     Height: 1.524 m (5')         PHYSICAL EXAM:    General:  alert, interactive, uncomfortable appearing and holding right flank area.  Eyes:  conjunctivae clear, conjugate gaze  HENT:  atraumatic, nose with no rhinorrhea, oropharynx clear  Neck:  no meningismus  Cardiovascular:  HR 80's during exam, regular rhythm, no murmurs, brisk cap refill  Chest:  no chest wall tenderness  Pulmonary:  no stridor, normal phonation, normal work of breathing, clear lungs bilaterally  Abdomen:  soft, nondistended, nontender  :  no CVA tenderness  Back:  no midline spinal tenderness  Musculoskeletal:  no pretibial edema, no calf tenderness. Gross ROM intact to joints of extremities with no obvious deformities.  Skin:  warm, dry, no rash  Neuro:  awake, alert, answers questions appropriately, follows commands, moves all limbs  Psych:  calm, normal affect      --------------- RESULTS ---------------  LAB:  Reviewed and independently interpreted by me.  Results for orders placed or performed during the hospital encounter of 09/30/24   Basic metabolic panel   Result Value Ref Range    Sodium 139 135 - 145 mmol/L    Potassium 4.2 3.4 - 5.3 mmol/L    Chloride 105 98 - 107 mmol/L    Carbon Dioxide (CO2) 19 (L) 22 - 29 mmol/L    Anion Gap 15 7 - 15 mmol/L    Urea Nitrogen 11.3 6.0 - 20.0 mg/dL    Creatinine 1.17 (H) 0.51 - 0.95 mg/dL    GFR Estimate 55 (L) >60 mL/min/1.73m2    Calcium 9.3 8.8 - 10.4 mg/dL    Glucose 94 70 - 99 mg/dL   Result Value Ref Range    Procalcitonin 0.06 <0.50 ng/mL   Result Value Ref Range    CRP Inflammation 18.70 (H) <5.00 mg/L   Result Value Ref Range    Magnesium 1.8 1.7 - 2.3 mg/dL   Hepatic function panel   Result Value Ref Range    Protein Total 6.9 6.4 - 8.3 g/dL    Albumin 3.8 3.5 - 5.2 g/dL    Bilirubin Total 0.4 <=1.2 mg/dL    Alkaline Phosphatase 80 40 - 150 U/L    AST 27 0 - 45 U/L    ALT 28 0 - 50 U/L    Bilirubin Direct     UA with Microscopic reflex to Culture    Specimen: Urine,  Clean Catch   Result Value Ref Range    Color Urine Light Yellow Colorless, Straw, Light Yellow, Yellow    Appearance Urine Turbid (A) Clear    Glucose Urine Negative Negative mg/dL    Bilirubin Urine Negative Negative    Ketones Urine Negative Negative mg/dL    Specific Gravity Urine 1.009 1.001 - 1.030    Blood Urine 0.2 mg/dL (A) Negative    pH Urine 5.5 5.0 - 7.0    Protein Albumin Urine Negative Negative mg/dL    Urobilinogen Urine <2.0 <2.0 mg/dL    Nitrite Urine Negative Negative    Leukocyte Esterase Urine 500 Jessi/uL (A) Negative    Bacteria Urine Few (A) None Seen /HPF    Mucus Urine Present (A) None Seen /LPF    RBC Urine 15 (H) <=2 /HPF    WBC Urine 16 (H) <=5 /HPF    Squamous Epithelials Urine 12 (H) <=1 /HPF   CBC with platelets and differential   Result Value Ref Range    WBC Count 5.6 4.0 - 11.0 10e3/uL    RBC Count 4.68 3.80 - 5.20 10e6/uL    Hemoglobin 13.3 11.7 - 15.7 g/dL    Hematocrit 40.5 35.0 - 47.0 %    MCV 87 78 - 100 fL    MCH 28.4 26.5 - 33.0 pg    MCHC 32.8 31.5 - 36.5 g/dL    RDW 13.2 10.0 - 15.0 %    Platelet Count 243 150 - 450 10e3/uL    % Neutrophils 56 %    % Lymphocytes 37 %    % Monocytes 6 %    % Eosinophils 0 %    % Basophils 1 %    % Immature Granulocytes 0 %    NRBCs per 100 WBC 0 <1 /100    Absolute Neutrophils 3.1 1.6 - 8.3 10e3/uL    Absolute Lymphocytes 2.1 0.8 - 5.3 10e3/uL    Absolute Monocytes 0.3 0.0 - 1.3 10e3/uL    Absolute Eosinophils 0.0 0.0 - 0.7 10e3/uL    Absolute Basophils 0.0 0.0 - 0.2 10e3/uL    Absolute Immature Granulocytes 0.0 <=0.4 10e3/uL    Absolute NRBCs 0.0 10e3/uL   UA with Microscopic reflex to Culture    Specimen: Urine, Midstream   Result Value Ref Range    Color Urine Colorless Colorless, Straw, Light Yellow, Yellow    Appearance Urine Clear Clear    Glucose Urine Negative Negative mg/dL    Bilirubin Urine Negative Negative    Ketones Urine Negative Negative mg/dL    Specific Gravity Urine 1.004 1.001 - 1.030    Blood Urine 0.03 mg/dL (A) Negative     pH Urine 5.5 5.0 - 7.0    Protein Albumin Urine Negative Negative mg/dL    Urobilinogen Urine <2.0 <2.0 mg/dL    Nitrite Urine Negative Negative    Leukocyte Esterase Urine Negative Negative    RBC Urine <1 <=2 /HPF    WBC Urine 1 <=5 /HPF    Squamous Epithelials Urine 1 <=1 /HPF               --------------- ADDITIONAL MDM ---------------  MIPS:  Not Applicable    History:  - I considered systemic symptoms of the presenting illness.  - Supplemental history from:       -- patient, family (daughter)  - External Record(s) reviewed:       -- Inpatient/outpatient record (clinic visit 9/10/24), prior labs (blood/urine 9/28/24), prior imaging (CT abdomen/pelvis 9/28/24)       -- see above ED course & MDM for further details    Workup:  - Chart documentation above includes differential considered and any EKGs or imaging independently interpreted by provider.  - emergent/severe conditions considered and evaluated for: see above differential & MDM  - In additional to work up documented, I considered the following work up:       -- CTA chest/abdomen/pelvis       -- see above ED course & MDM for further details    External Consultation:  - Discussion of management with another provider:       -- ED pharmacist re: meds       -- see above charting for additional    Complicating Factors:  - Care impacted by chronic illness:       -- see above MDM, past medical history, & problem list  - Care affected by social determinants of health:       -- see above social history       -- Access to Affordable Healthcare       -- Access to Medical Care    Disposition Considerations:  - Discharge       -- I considered escalation of care with admission to the hospital, but ultimately discharged the patient given symptoms resolved, tolerating PO       -- I recommended the patient continue their current prescription strength medication(s) as charted above in current medications list       -- I prescribed prescription strength medication(s) as  charted above       -- I recommended over-the-counter medication(s) as charted above & in discharge instructions       I, Jonoosbaldo Eve, am serving as a scribe to document services personally performed by Dr. Laron Rodriguez based on my observation and the provider's statements to me. I, Laron Rodriguez MD attest that Mikie Prado is acting in a scribe capacity, has observed my performance of the services and has documented them in accordance with my direction.      Laron Rodriugez MD  09/30/24  Emergency Medicine  [facility] EMERGENCY ROOM  [address] [facility] [address]  [phone]  Dept: [phone]     Laron Rodriguez MD  09/30/24 9859

## 2024-09-30 NOTE — ED TRIAGE NOTES
Pt with kidney stone dx 2 days ago and rx'ed oxycodone. Patient reports oxy is only making her out of it and still is having R flank and abdominal pain. No more blood in urine. Pt reports chills but no measured fever. No dysuria.     Triage Assessment (Adult)       Row Name 09/30/24 0837          Triage Assessment    Airway WDL WDL        Respiratory WDL    Respiratory WDL WDL        Skin Circulation/Temperature WDL    Skin Circulation/Temperature WDL WDL        Cardiac WDL    Cardiac WDL WDL        Peripheral/Neurovascular WDL    Peripheral Neurovascular WDL WDL        Cognitive/Neuro/Behavioral WDL    Cognitive/Neuro/Behavioral WDL WDL

## 2024-09-30 NOTE — DISCHARGE INSTRUCTIONS
For pain control at home, take:  - over-the-counter ibuprofen 800mg by mouth every 8 hours (max dose 2400mg in 24 hours) scheduled for the next 4 days, then as needed after that  - over-the-counter acetaminophen 1g by mouth every 6 hours (max dose 4g in 24 hours) scheduled for the next 4 days, then as needed after that  - prescribed oxycodone for breakthrough pain    Take the Zofran & Reglan as needed for any ongoing nausea or vomiting.    Take the Flomax as scheduled. This can help with passing the kidney stone.    Call kidney stone clinic to arrange follow up with them next week.    Follow up with your Primary Care provider in 2 days for a recheck.    Return to the Emergency Department for any persistent vomiting, severe worsening, or any other concerns.

## 2024-10-01 LAB — BACTERIA UR CULT: NORMAL

## 2024-10-03 DIAGNOSIS — N20.1 CALCULUS OF URETER: Primary | ICD-10-CM

## 2024-10-18 NOTE — CONFIDENTIAL NOTE
MEDICAL RECORDS REQUEST   [facility]  [address] [place] [address]  PHONE: [phone]  Fax: [phone]        FUTURE VISIT INFORMATION                                                   Saima Gilbert, : 1970 scheduled for future visit at [facility]    APPOINTMENT INFORMATION:  Date: 2024  Provider: Nury Alicea CNP   Reason for Visit/Diagnosis: Nephrolithiasis     REFERRAL INFORMATION:  Referring provider: Sera Padilla PA-C    Specialty: N/A  Referring providers clinic: [facility] contact number:  [phone]     RECORDS REQUESTED FOR VISIT                                                     NOTES  STATUS/DETAILS   OFFICE NOTE from referring provider  Yes  2024 Sera Padilla PA-C    DISCHARGE REPORT from the ER  yes 2024 Laron Rodriguez MD    MEDICATION LIST  yes   LABS     URINALYSIS (UA)  yes   KIDNEY STONE     CT STONE  yes   IMAGING (IMAGES & REPORT)  yes 2024 CT ABDOMEN PELVIS W CONTRAST    LABS  yes     PRE-VISIT CHECKLIST      Record collection complete Yes   Appointment appropriately scheduled           (right time/right provider) Yes   MyChart activation No

## 2024-11-04 ENCOUNTER — PRE VISIT (OUTPATIENT)
Dept: UROLOGY | Facility: CLINIC | Age: 54
End: 2024-11-04
Payer: COMMERCIAL

## 2024-11-04 NOTE — TELEPHONE ENCOUNTER
Reason for visit: kidney stone prevention     Relevant information: pt speaks some english, Malay is her first language. Her daughter who scheduled the appointment will be present with her.    Records/imaging/labs/orders: all records available    Hortencia Hanks  11/4/2024  9:41 AM

## 2024-11-08 ENCOUNTER — PRE VISIT (OUTPATIENT)
Dept: UROLOGY | Facility: CLINIC | Age: 54
End: 2024-11-08

## 2024-11-08 ENCOUNTER — VIRTUAL VISIT (OUTPATIENT)
Dept: UROLOGY | Facility: CLINIC | Age: 54
End: 2024-11-08
Payer: COMMERCIAL

## 2024-11-08 DIAGNOSIS — Z87.442 HISTORY OF KIDNEY STONES: Primary | ICD-10-CM

## 2024-11-08 PROCEDURE — 99203 OFFICE O/P NEW LOW 30 MIN: CPT | Mod: 95 | Performed by: NURSE PRACTITIONER

## 2024-11-08 NOTE — PATIENT INSTRUCTIONS
UROLOGY CLINIC VISIT PATIENT INSTRUCTIONS    -Work on increasing your fluid intake by adding a few glasses of water throughout the day.   -Increase your dairy intake.   -Reduce your intake of spinach.   -Will repeat a CT scan in September, 2025 to check for new stones. Follow up with me at that time to review results.     If you have any issues, questions or concerns in the meantime, do not hesitate to contact us at [phone] or via [employer].     Nury Alicea CNP  Department of Urology

## 2024-11-08 NOTE — PROGRESS NOTES
Virtual Visit Details    Type of service:  Video Visit   Video Start Time: 9:28 AM  Video End Time: 9:43 AM    Originating Location (pt. Location): Home  Distant Location (provider location):  Off-site  Platform used for Video Visit: El    CC: Kidney stones    Assessment/Plan:  54 year old female with a history of two kidney stones, both of which she has now passed, one recently. She has this stone in her possession and will submit to the lab for analysis. No residual stones now. We reviewed general recommendations to prevent kidney stones including the followin) Low oxalate diet. We discussed foods that are particularly high in oxalate including spinach, rhubarb, beets, nuts, nut butters, and black teas.     2) Low salt diet. Discussed common foods with high amounts of salt as well as dietary strategies to minimize sodium.     3) High fluid intake. Recommend 3 liters of fluid daily to produce goal of 2.5L of urine.     4) Modest animal protein. Recommend limiting animal protein to one serving or less daily.     5) Normal dietary calcium.    Diet notably low in fluid (she only drinks tea and coffee during the day, no water) and high in oxalates (spinach). We discussed the role of Litholink in helping to evaluate stone risk further, though given that she only produced the one round of stones and there is clear stone risks to address, will defer this for now in favor of kidney monitoring. Will plan to repeat a CT in  and if evidence of new stone growth either at that time or beforehand, will need to pursue Litholink for further eval.     -Stone analysis  -Follow up with me in  with a CT completed beforehand.     Nury Alicea CNP  Department of Urology      Subjective:   54 year old female who presents for virtual consult regarding kidney stones. Per chart review, she was seen in the ED on 24 for right-sided flank pain and difficulty voiding. CT obtained and showed an  obstructing 4 mm stone in her proximal right ureter with mild upstream collecting system dilation. No other stones present.     She was fortunately able to pass her stone. Stone analysis order was placed but has not been completed yet. She had a follow up abdominal ultrasound done on 10/25 that showed no hydro or evidence of stones.     Today, she states that she has felt better since her stone passed. She was found to have two stones last year, one in each kidney. She passed one at that time and now passed the other one now. No residual stones. No family history.    Dietary information as follows:   -Breakfast: Eggs and cheese; tea with milk  -Dinner: Pasta or rice  -Fluids: Tea, (three cups) coffee (one cup); minimal water  -Snacks: Fruit (oranges, bananas)  -Dairy: Cheese, milk  -Oxalates: Eats spinach (cooks in meals often), no nuts  -She does take a calcium supplement daily.     Objective:     Exam:   Patient is a 54 year old female   No vital signs obtained due to virtual visit.  General Appearance: Well groomed, hygenic  Respiratory: No cough, no respiratory distress or labored breathing  Musculoskeletal: Grossly normal with no gross deficits  Skin: No discoloration or apparent rashes  Neurologic: No tremors  Psychiatric: Alert and oriented  Further examination is deferred due to the nature of our visit.

## 2024-11-08 NOTE — LETTER
2024       RE: Saima Gilbert  [address]  [place] [address]     Dear Colleague,    Thank you for referring your patient, Saima Gilbert, to the [facility] [place] at [facility]. Please see a copy of my visit note below.    Virtual Visit Details    Type of service:  Video Visit   Video Start Time: 9:28 AM  Video End Time: 9:43 AM    Originating Location (pt. Location): Home  Distant Location (provider location):  Off-site  Platform used for Video Visit: El    CC: Kidney stones    Assessment/Plan:  54 year old female with a history of two kidney stones, both of which she has now passed, one recently. She has this stone in her possession and will submit to the lab for analysis. No residual stones now. We reviewed general recommendations to prevent kidney stones including the followin) Low oxalate diet. We discussed foods that are particularly high in oxalate including spinach, rhubarb, beets, nuts, nut butters, and black teas.     2) Low salt diet. Discussed common foods with high amounts of salt as well as dietary strategies to minimize sodium.     3) High fluid intake. Recommend 3 liters of fluid daily to produce goal of 2.5L of urine.     4) Modest animal protein. Recommend limiting animal protein to one serving or less daily.     5) Normal dietary calcium.    Diet notably low in fluid (she only drinks tea and coffee during the day, no water) and high in oxalates (spinach). We discussed the role of Litholink in helping to evaluate stone risk further, though given that she only produced the one round of stones and there is clear stone risks to address, will defer this for now in favor of kidney monitoring. Will plan to repeat a CT in  and if evidence of new stone growth either at that time or beforehand, will need to pursue Litholink for further eval.     -Stone analysis  -Follow up with me in  September, 2025 with a CT completed beforehand.     Nury Alicea CNP  Department of Urology      Subjective:   54 year old female who presents for virtual consult regarding kidney stones. Per chart review, she was seen in the ED on 9/28/24 for right-sided flank pain and difficulty voiding. CT obtained and showed an obstructing 4 mm stone in her proximal right ureter with mild upstream collecting system dilation. No other stones present.     She was fortunately able to pass her stone. Stone analysis order was placed but has not been completed yet. She had a follow up abdominal ultrasound done on 10/25 that showed no hydro or evidence of stones.     Today, she states that she has felt better since her stone passed. She was found to have two stones last year, one in each kidney. She passed one at that time and now passed the other one now. No residual stones. No family history.    Dietary information as follows:   -Breakfast: Eggs and cheese; tea with milk  -Dinner: Pasta or rice  -Fluids: Tea, (three cups) coffee (one cup); minimal water  -Snacks: Fruit (oranges, bananas)  -Dairy: Cheese, milk  -Oxalates: Eats spinach (cooks in meals often), no nuts  -She does take a calcium supplement daily.     Objective:     Exam:   Patient is a 54 year old female   No vital signs obtained due to virtual visit.  General Appearance: Well groomed, hygenic  Respiratory: No cough, no respiratory distress or labored breathing  Musculoskeletal: Grossly normal with no gross deficits  Skin: No discoloration or apparent rashes  Neurologic: No tremors  Psychiatric: Alert and oriented  Further examination is deferred due to the nature of our visit.             Again, thank you for allowing me to participate in the care of your patient.      Sincerely,    Nury Alicea CNP

## 2024-11-08 NOTE — NURSING NOTE
Current patient location: [place]    Is the patient currently in the state of [place]? YES    Visit mode:VIDEO    If the visit is dropped, the patient can be reconnected by: VIDEO VISIT: Text to cell phone:   Telephone Information:   Mobile [phone]       Will anyone else be joining the visit? NO  (If patient encounters technical issues they should call [phone] :[id number])    Are changes needed to the allergy or medication list? Pt stated no changes to allergies and Pt stated no med changes    Are refills needed on medications prescribed by this physician? Discuss with provider    Rooming Documentation:  Not applicable    Reason for visit: Consult    Jessica ARZATE

## 2024-11-11 ENCOUNTER — LAB (OUTPATIENT)
Dept: LAB | Facility: HOSPITAL | Age: 54
End: 2024-11-11
Payer: COMMERCIAL

## 2024-11-11 DIAGNOSIS — N20.1 CALCULUS OF URETER: ICD-10-CM

## 2024-11-11 PROCEDURE — 82365 CALCULUS SPECTROSCOPY: CPT

## 2024-11-15 ENCOUNTER — TELEPHONE (OUTPATIENT)
Dept: UROLOGY | Facility: CLINIC | Age: 54
End: 2024-11-15
Payer: COMMERCIAL

## 2024-11-15 LAB
APPEARANCE STONE: NORMAL
COMPN STONE: NORMAL
SPECIMEN WT: 26 MG

## 2024-11-15 NOTE — TELEPHONE ENCOUNTER
Left message for patient on behalf of Nury Alicea to call back regarding stone analysis results.     Uric acid stones, rec 24 hour urine collection and follow up with Nury to review.     REUBEN Leon  Care Coordinator- Urology   [phone]

## 2024-11-15 NOTE — RESULT ENCOUNTER NOTE
Redd Leon,    Can you touch base with the patient regarding her stone analysis results? We reviewed prevention measures at the time of our last visit under the assumption she was making calcium oxalate stones, based on her diet. However, stones came back composed of uric acid. We opted not to pursue a Litholink at the time of our visit but now I think we should do it. Can you let her know I'm recommending this now and place the order? Will need follow up to review.     Thanks!    Nury

## 2024-12-11 ENCOUNTER — TELEPHONE (OUTPATIENT)
Dept: UROLOGY | Facility: CLINIC | Age: 54
End: 2024-12-11

## 2024-12-11 NOTE — TELEPHONE ENCOUNTER
[facility] Call Center    Phone Message    May a detailed message be left on voicemail: no     Reason for Call: Other: Patient called stating that she was supposed to get a kit in the mail and it never showed up. Please call patient back to follow up.     Action Taken: Message routed to:  [facility] Urology    Travel Screening: Not Applicable     Date of Service:

## 2024-12-13 NOTE — TELEPHONE ENCOUNTER
Per chart review, litholink kit sent per Nury on 11/15. Confirmed address with patient. RN called [facility] and re-ordered kit.     REUBEN Leon  Care Coordinator- Urology   [phone]

## 2024-12-25 ENCOUNTER — LAB (OUTPATIENT)
Dept: LAB | Facility: HOSPITAL | Age: 54
End: 2024-12-25
Payer: COMMERCIAL

## 2024-12-25 DIAGNOSIS — R82.90 ABNORMAL URINALYSIS: Primary | ICD-10-CM

## 2024-12-25 LAB
ALBUMIN UR-MCNC: NEGATIVE MG/DL
APPEARANCE UR: CLEAR
BILIRUB UR QL STRIP: NEGATIVE
COLOR UR AUTO: YELLOW
GLUCOSE UR STRIP-MCNC: NEGATIVE MG/DL
HGB UR QL STRIP: NEGATIVE
KETONES UR STRIP-MCNC: NEGATIVE MG/DL
LEUKOCYTE ESTERASE UR QL STRIP: NEGATIVE
NITRATE UR QL: NEGATIVE
PH UR STRIP: 5.5 [PH] (ref 5–7)
SP GR UR STRIP: 1.02 (ref 1–1.03)
UROBILINOGEN UR STRIP-MCNC: <2 MG/DL

## 2024-12-25 PROCEDURE — 81003 URINALYSIS AUTO W/O SCOPE: CPT

## 2025-01-03 ENCOUNTER — PRE VISIT (OUTPATIENT)
Dept: UROLOGY | Facility: CLINIC | Age: 55
End: 2025-01-03
Payer: COMMERCIAL

## 2025-01-03 NOTE — TELEPHONE ENCOUNTER
Reason for visit: follow up      Relevant information: go over litholink results review, hx of kidney stones    Records/imaging/labs/orders: litholink not in as of 1/3/25     Ora Ramirez MA  1/3/2025  11:00 AM

## 2025-01-28 NOTE — TELEPHONE ENCOUNTER
Left Voicemail (1st Attempt) for the patient to call back and schedule the following:    Appointment type: RTN VV  Provider: Sissy Goodman  Return date: 2 months  Specialty phone number: [phone]  Additional appointment(s) needed: Litjolink to be sent  Additonal Notes: NA

## 2025-01-29 NOTE — TELEPHONE ENCOUNTER
Left Voicemail (2nd Attempt) for the patient to call back and schedule the following:    Appointment type: RTN  Provider: Sissy Goodman   Return date: 2 months  Specialty phone number: [phone]  Additional appointment(s) needed: NA  Additonal Notes: NA